# Patient Record
Sex: MALE | Race: WHITE | NOT HISPANIC OR LATINO | Employment: UNEMPLOYED | RURAL
[De-identification: names, ages, dates, MRNs, and addresses within clinical notes are randomized per-mention and may not be internally consistent; named-entity substitution may affect disease eponyms.]

---

## 2022-01-21 PROCEDURE — 99282 EMERGENCY DEPT VISIT SF MDM: CPT | Mod: ,,, | Performed by: EMERGENCY MEDICINE

## 2022-01-21 PROCEDURE — 99282 EMERGENCY DEPT VISIT SF MDM: CPT | Performed by: EMERGENCY MEDICINE

## 2022-01-21 PROCEDURE — 99282 PR EMERGENCY DEPT VISIT,LEVEL II: ICD-10-PCS | Mod: ,,, | Performed by: EMERGENCY MEDICINE

## 2022-01-21 PROCEDURE — 12001 RPR S/N/AX/GEN/TRNK 2.5CM/<: CPT | Performed by: EMERGENCY MEDICINE

## 2022-01-22 ENCOUNTER — HOSPITAL ENCOUNTER (EMERGENCY)
Facility: HOSPITAL | Age: 47
Discharge: HOME | End: 2022-01-22
Attending: EMERGENCY MEDICINE

## 2022-01-22 VITALS
SYSTOLIC BLOOD PRESSURE: 152 MMHG | HEART RATE: 88 BPM | HEIGHT: 69 IN | DIASTOLIC BLOOD PRESSURE: 94 MMHG | WEIGHT: 193 LBS | RESPIRATION RATE: 18 BRPM | TEMPERATURE: 98 F | BODY MASS INDEX: 28.58 KG/M2

## 2022-01-22 DIAGNOSIS — S61.218A LACERATION OF INDEX FINGER WITHOUT FOREIGN BODY WITHOUT DAMAGE TO NAIL, UNSPECIFIED LATERALITY, INITIAL ENCOUNTER: Primary | ICD-10-CM

## 2022-01-22 PROCEDURE — 12001 PR RESUPERF WND BODY <2.5CM: ICD-10-PCS | Mod: ,,, | Performed by: EMERGENCY MEDICINE

## 2022-01-22 PROCEDURE — 12001 RPR S/N/AX/GEN/TRNK 2.5CM/<: CPT | Mod: ,,, | Performed by: EMERGENCY MEDICINE

## 2022-01-22 NOTE — ED PROVIDER NOTES
Encounter Date: 1/21/2022    SCRIBE #1 NOTE: I, [place] Mahnaz, am scribing for, and in the presence of,  Peyman Baeza MD. I have scribed the entire note.       History     Chief Complaint   Patient presents with    Laceration     Laceration to right index finger. Had an open knife in pocket and cut his finger.     Patient is a 46 year old male who presents to the emergency department due to a laceration on his right index finger. Patient explains that around 20:00 last night he placed his hand into his pocket, cutting the index finger on an open knife that was in the pocket. Patient states that he bandaged up the area himself but became concerned about tendon damage so reported to the emergency department. Based on Dr. Baeza's exam it appears that this laceration is eligible to be glued.     The history is provided by the patient. No  was used.     Review of patient's allergies indicates:  No Known Allergies  No past medical history on file.  No past surgical history on file.  No family history on file.  Social History     Tobacco Use    Smoking status: Never Smoker    Smokeless tobacco: Never Used   Substance Use Topics    Alcohol use: Not Currently    Drug use: Not Currently     Review of Systems   Constitutional: Negative.    HENT: Negative.    Eyes: Negative.    Respiratory: Negative.    Cardiovascular: Negative.    Gastrointestinal: Negative.    Endocrine: Negative.    Genitourinary: Negative.    Musculoskeletal: Negative.    Skin:        Laceration on right index finger   Allergic/Immunologic: Negative.    Neurological: Negative.    Hematological: Negative.    Psychiatric/Behavioral: Negative.    All other systems reviewed and are negative.      Physical Exam     Initial Vitals [01/22/22 0001]   BP Pulse Resp Temp SpO2   (!) 152/94 88 18 97.8 °F (36.6 °C) --      MAP       --         Physical Exam    Musculoskeletal:      Right hand: Laceration (Index finger, 1.5cm) present.         Hands:            ED Course   Lac Repair    Date/Time: 1/22/2022 1:11 AM  Performed by: Peyman Baeza MD  Authorized by: Peyman Baeza MD     Consent:     Consent obtained:  Verbal    Consent given by:  Patient  Anesthesia:     Anesthesia method:  None  Laceration details:     Location:  Finger    Finger location:  R index finger    Length (cm):  1.5  Pre-procedure details:     Preparation:  Patient was prepped and draped in usual sterile fashion  Treatment:     Area cleansed with:  Saline    Irrigation solution:  Tap water  Skin repair:     Repair method: Dermabond.  Repair type:     Repair type:  Simple  Post-procedure details:     Dressing:  Open (no dressing)    Procedure completion:  Tolerated      Labs Reviewed - No data to display       Imaging Results    None          Medications - No data to display             Attending Attestation:           Physician Attestation for Scribe:  Physician Attestation Statement for Scribe #1: I, Peyman Baeza MD, reviewed documentation, as scribed by Carla Joya in my presence, and it is both accurate and complete.                      Clinical Impression:   Final diagnoses:  [S61.218A] Laceration of index finger without foreign body without damage to nail, unspecified laterality, initial encounter (Primary)          ED Disposition Condition    Discharge Stable        ED Prescriptions     None        Follow-up Information    None          Peyman Baeza MD  01/22/22 0444

## 2022-03-25 ENCOUNTER — ANESTHESIA EVENT (OUTPATIENT)
Dept: SURGERY | Facility: HOSPITAL | Age: 47
DRG: 494 | End: 2022-03-25

## 2022-03-25 ENCOUNTER — ANESTHESIA (OUTPATIENT)
Dept: SURGERY | Facility: HOSPITAL | Age: 47
DRG: 494 | End: 2022-03-25

## 2022-03-25 ENCOUNTER — HOSPITAL ENCOUNTER (INPATIENT)
Facility: HOSPITAL | Age: 47
LOS: 2 days | Discharge: HOME | DRG: 494 | End: 2022-03-27
Attending: ORTHOPAEDIC SURGERY | Admitting: ORTHOPAEDIC SURGERY

## 2022-03-25 DIAGNOSIS — L03.90 CELLULITIS, UNSPECIFIED CELLULITIS SITE: Primary | ICD-10-CM

## 2022-03-25 DIAGNOSIS — R60.9 EDEMA: ICD-10-CM

## 2022-03-25 DIAGNOSIS — M00.9 SEPTIC ARTHRITIS OF RIGHT ANKLE: ICD-10-CM

## 2022-03-25 DIAGNOSIS — M00.9 SEPTIC ARTHRITIS OF RIGHT ANKLE, DUE TO UNSPECIFIED ORGANISM: ICD-10-CM

## 2022-03-25 LAB
ANION GAP SERPL CALCULATED.3IONS-SCNC: 10 MMOL/L (ref 7–16)
BASOPHILS # BLD AUTO: 0.04 K/UL (ref 0–0.2)
BASOPHILS NFR BLD AUTO: 0.3 % (ref 0–1)
BUN SERPL-MCNC: 13 MG/DL (ref 7–18)
BUN/CREAT SERPL: 16 (ref 6–20)
CALCIUM SERPL-MCNC: 8.2 MG/DL (ref 8.5–10.1)
CHLORIDE SERPL-SCNC: 100 MMOL/L (ref 98–107)
CLARITY FLD: ABNORMAL
CO2 SERPL-SCNC: 29 MMOL/L (ref 21–32)
COLOR FLD: ABNORMAL
CREAT SERPL-MCNC: 0.79 MG/DL (ref 0.7–1.3)
CRYSTALS SNV MICRO: NORMAL
DIFFERENTIAL METHOD BLD: ABNORMAL
EOSINOPHIL # BLD AUTO: 0.13 K/UL (ref 0–0.5)
EOSINOPHIL NFR BLD AUTO: 0.9 % (ref 1–4)
ERYTHROCYTE [DISTWIDTH] IN BLOOD BY AUTOMATED COUNT: 12.7 % (ref 11.5–14.5)
FLUAV AG UPPER RESP QL IA.RAPID: NEGATIVE
FLUBV AG UPPER RESP QL IA.RAPID: NEGATIVE
GLUCOSE SERPL-MCNC: 182 MG/DL (ref 74–106)
GRAM STN SPEC: NORMAL
GRANULOCYTES NFR SNV MANUAL: 92 % (ref 0–25)
HCT VFR BLD AUTO: 42.9 % (ref 40–54)
HGB BLD-MCNC: 14.3 G/DL (ref 13.5–18)
IMM GRANULOCYTES # BLD AUTO: 0.11 K/UL (ref 0–0.04)
IMM GRANULOCYTES NFR BLD: 0.8 % (ref 0–0.4)
LACTATE SERPL-SCNC: 1.7 MMOL/L (ref 0.4–2)
LYMPHOCYTES # BLD AUTO: 2.09 K/UL (ref 1–4.8)
LYMPHOCYTES NFR BLD AUTO: 14.6 % (ref 27–41)
LYMPHOCYTES NFR SNV MANUAL: 6 %
MACROPHAGES NFR SNV MANUAL: 1 %
MCH RBC QN AUTO: 29.1 PG (ref 27–31)
MCHC RBC AUTO-ENTMCNC: 33.3 G/DL (ref 32–36)
MCV RBC AUTO: 87.4 FL (ref 80–96)
MONOCYTES # BLD AUTO: 0.93 K/UL (ref 0–0.8)
MONOCYTES NFR BLD AUTO: 6.5 % (ref 2–6)
MONOCYTES NFR SNV MANUAL: 1 %
MPC BLD CALC-MCNC: 8.9 FL (ref 9.4–12.4)
NEUTROPHILS # BLD AUTO: 10.99 K/UL (ref 1.8–7.7)
NEUTROPHILS NFR BLD AUTO: 76.9 % (ref 53–65)
NRBC # BLD AUTO: 0 X10E3/UL
NRBC, AUTO (.00): 0 %
PLATELET # BLD AUTO: 342 K/UL (ref 150–400)
POTASSIUM SERPL-SCNC: 3.6 MMOL/L (ref 3.5–5.1)
RBC # BLD AUTO: 4.91 M/UL (ref 4.6–6.2)
RBC # SNV MANUAL: ABNORMAL /CUMM
SARS-COV+SARS-COV-2 AG RESP QL IA.RAPID: NEGATIVE
SODIUM SERPL-SCNC: 135 MMOL/L (ref 136–145)
URATE SERPL-MCNC: 3.5 MG/DL (ref 3.5–7.2)
WBC # BLD AUTO: 14.29 K/UL (ref 4.5–11)
WBC # SNV MANUAL: ABNORMAL /CUMM

## 2022-03-25 PROCEDURE — 83605 ASSAY OF LACTIC ACID: CPT | Performed by: NURSE PRACTITIONER

## 2022-03-25 PROCEDURE — 87428 SARSCOV & INF VIR A&B AG IA: CPT | Performed by: ORTHOPAEDIC SURGERY

## 2022-03-25 PROCEDURE — 25000003 PHARM REV CODE 250: Performed by: EMERGENCY MEDICINE

## 2022-03-25 PROCEDURE — 85025 COMPLETE CBC W/AUTO DIFF WBC: CPT | Performed by: NURSE PRACTITIONER

## 2022-03-25 PROCEDURE — 87205 SMEAR GRAM STAIN: CPT | Performed by: EMERGENCY MEDICINE

## 2022-03-25 PROCEDURE — 89050 BODY FLUID CELL COUNT: CPT | Performed by: EMERGENCY MEDICINE

## 2022-03-25 PROCEDURE — 87653 STREP B DNA AMP PROBE: CPT | Performed by: EMERGENCY MEDICINE

## 2022-03-25 PROCEDURE — 87040 BLOOD CULTURE FOR BACTERIA: CPT | Performed by: NURSE PRACTITIONER

## 2022-03-25 PROCEDURE — D9220A PRA ANESTHESIA: Mod: ,,, | Performed by: ANESTHESIOLOGY

## 2022-03-25 PROCEDURE — 96372 THER/PROPH/DIAG INJ SC/IM: CPT

## 2022-03-25 PROCEDURE — 63600175 PHARM REV CODE 636 W HCPCS: Performed by: ANESTHESIOLOGY

## 2022-03-25 PROCEDURE — 99285 EMERGENCY DEPT VISIT HI MDM: CPT

## 2022-03-25 PROCEDURE — 71000033 HC RECOVERY, INTIAL HOUR: Performed by: ORTHOPAEDIC SURGERY

## 2022-03-25 PROCEDURE — 25000003 PHARM REV CODE 250: Performed by: NURSE PRACTITIONER

## 2022-03-25 PROCEDURE — 63600175 PHARM REV CODE 636 W HCPCS: Performed by: NURSE PRACTITIONER

## 2022-03-25 PROCEDURE — 99284 EMERGENCY DEPT VISIT MOD MDM: CPT | Mod: ,,, | Performed by: NURSE PRACTITIONER

## 2022-03-25 PROCEDURE — 37000008 HC ANESTHESIA 1ST 15 MINUTES: Performed by: ORTHOPAEDIC SURGERY

## 2022-03-25 PROCEDURE — 99284 PR EMERGENCY DEPT VISIT,LEVEL IV: ICD-10-PCS | Mod: ,,, | Performed by: NURSE PRACTITIONER

## 2022-03-25 PROCEDURE — 84550 ASSAY OF BLOOD/URIC ACID: CPT | Performed by: NURSE PRACTITIONER

## 2022-03-25 PROCEDURE — 37000009 HC ANESTHESIA EA ADD 15 MINS: Performed by: ORTHOPAEDIC SURGERY

## 2022-03-25 PROCEDURE — 27201423 OPTIME MED/SURG SUP & DEVICES STERILE SUPPLY: Performed by: ORTHOPAEDIC SURGERY

## 2022-03-25 PROCEDURE — 87070 CULTURE OTHR SPECIMN AEROBIC: CPT | Performed by: ORTHOPAEDIC SURGERY

## 2022-03-25 PROCEDURE — 11000001 HC ACUTE MED/SURG PRIVATE ROOM

## 2022-03-25 PROCEDURE — 36000706: Performed by: ORTHOPAEDIC SURGERY

## 2022-03-25 PROCEDURE — 80048 BASIC METABOLIC PNL TOTAL CA: CPT | Performed by: NURSE PRACTITIONER

## 2022-03-25 PROCEDURE — 36000707: Performed by: ORTHOPAEDIC SURGERY

## 2022-03-25 PROCEDURE — D9220A PRA ANESTHESIA: ICD-10-PCS | Mod: ,,, | Performed by: ANESTHESIOLOGY

## 2022-03-25 PROCEDURE — 36415 COLL VENOUS BLD VENIPUNCTURE: CPT | Performed by: NURSE PRACTITIONER

## 2022-03-25 PROCEDURE — 87075 CULTR BACTERIA EXCEPT BLOOD: CPT | Performed by: ORTHOPAEDIC SURGERY

## 2022-03-25 RX ORDER — CLINDAMYCIN HYDROCHLORIDE 300 MG/1
300 CAPSULE ORAL EVERY 6 HOURS
Qty: 40 CAPSULE | Refills: 0 | Status: SHIPPED | OUTPATIENT
Start: 2022-03-25 | End: 2022-04-04

## 2022-03-25 RX ORDER — HYDROCODONE BITARTRATE AND ACETAMINOPHEN 7.5; 325 MG/1; MG/1
1 TABLET ORAL EVERY 6 HOURS PRN
Status: DISCONTINUED | OUTPATIENT
Start: 2022-03-25 | End: 2022-03-27 | Stop reason: HOSPADM

## 2022-03-25 RX ORDER — LABETALOL HYDROCHLORIDE 5 MG/ML
10 INJECTION, SOLUTION INTRAVENOUS EVERY 5 MIN PRN
Status: COMPLETED | OUTPATIENT
Start: 2022-03-26 | End: 2022-03-26

## 2022-03-25 RX ORDER — LIDOCAINE HYDROCHLORIDE 10 MG/ML
10 INJECTION INFILTRATION; PERINEURAL
Status: COMPLETED | OUTPATIENT
Start: 2022-03-25 | End: 2022-03-25

## 2022-03-25 RX ORDER — HYDRALAZINE HYDROCHLORIDE 20 MG/ML
INJECTION INTRAMUSCULAR; INTRAVENOUS
Status: DISCONTINUED | OUTPATIENT
Start: 2022-03-25 | End: 2022-03-25

## 2022-03-25 RX ORDER — IBUPROFEN 800 MG/1
800 TABLET ORAL EVERY 8 HOURS PRN
Qty: 30 TABLET | Refills: 0 | Status: SHIPPED | OUTPATIENT
Start: 2022-03-25 | End: 2022-04-04

## 2022-03-25 RX ORDER — SODIUM CHLORIDE 0.9 % (FLUSH) 0.9 %
10 SYRINGE (ML) INJECTION EVERY 12 HOURS PRN
Status: DISCONTINUED | OUTPATIENT
Start: 2022-03-25 | End: 2022-03-27 | Stop reason: HOSPADM

## 2022-03-25 RX ORDER — MORPHINE SULFATE 2 MG/ML
2 INJECTION, SOLUTION INTRAMUSCULAR; INTRAVENOUS
Status: COMPLETED | OUTPATIENT
Start: 2022-03-25 | End: 2022-03-25

## 2022-03-25 RX ORDER — FENTANYL CITRATE 50 UG/ML
INJECTION, SOLUTION INTRAMUSCULAR; INTRAVENOUS
Status: DISCONTINUED | OUTPATIENT
Start: 2022-03-25 | End: 2022-03-25

## 2022-03-25 RX ORDER — HYDROCODONE BITARTRATE AND ACETAMINOPHEN 10; 325 MG/1; MG/1
1 TABLET ORAL EVERY 6 HOURS PRN
Status: DISCONTINUED | OUTPATIENT
Start: 2022-03-25 | End: 2022-03-27 | Stop reason: HOSPADM

## 2022-03-25 RX ORDER — CEFAZOLIN SODIUM 1 G/3ML
INJECTION, POWDER, FOR SOLUTION INTRAMUSCULAR; INTRAVENOUS
Status: DISCONTINUED | OUTPATIENT
Start: 2022-03-25 | End: 2022-03-25

## 2022-03-25 RX ORDER — HYDROCODONE BITARTRATE AND ACETAMINOPHEN 5; 325 MG/1; MG/1
1 TABLET ORAL EVERY 6 HOURS PRN
Status: DISCONTINUED | OUTPATIENT
Start: 2022-03-25 | End: 2022-03-27 | Stop reason: HOSPADM

## 2022-03-25 RX ORDER — LABETALOL HYDROCHLORIDE 5 MG/ML
10 INJECTION, SOLUTION INTRAVENOUS EVERY 5 MIN PRN
Status: DISCONTINUED | OUTPATIENT
Start: 2022-03-26 | End: 2022-03-25

## 2022-03-25 RX ORDER — CEFTRIAXONE 1 G/1
1 INJECTION, POWDER, FOR SOLUTION INTRAMUSCULAR; INTRAVENOUS
Status: COMPLETED | OUTPATIENT
Start: 2022-03-25 | End: 2022-03-25

## 2022-03-25 RX ORDER — ONDANSETRON 2 MG/ML
4 INJECTION INTRAMUSCULAR; INTRAVENOUS EVERY 8 HOURS PRN
Status: DISCONTINUED | OUTPATIENT
Start: 2022-03-25 | End: 2022-03-27 | Stop reason: HOSPADM

## 2022-03-25 RX ORDER — PROPOFOL 10 MG/ML
VIAL (ML) INTRAVENOUS
Status: DISCONTINUED | OUTPATIENT
Start: 2022-03-25 | End: 2022-03-25

## 2022-03-25 RX ORDER — LIDOCAINE HYDROCHLORIDE 10 MG/ML
5 INJECTION, SOLUTION EPIDURAL; INFILTRATION; INTRACAUDAL; PERINEURAL
Status: COMPLETED | OUTPATIENT
Start: 2022-03-25 | End: 2022-03-25

## 2022-03-25 RX ORDER — CLINDAMYCIN PHOSPHATE 600 MG/50ML
600 INJECTION, SOLUTION INTRAVENOUS
Status: DISCONTINUED | OUTPATIENT
Start: 2022-03-25 | End: 2022-03-27 | Stop reason: HOSPADM

## 2022-03-25 RX ADMIN — HYDROCODONE BITARTRATE AND ACETAMINOPHEN 1 TABLET: 7.5; 325 TABLET ORAL at 05:03

## 2022-03-25 RX ADMIN — MORPHINE SULFATE 2 MG: 2 INJECTION, SOLUTION INTRAMUSCULAR; INTRAVENOUS at 06:03

## 2022-03-25 RX ADMIN — LIDOCAINE HYDROCHLORIDE 10 ML: 10 INJECTION, SOLUTION INFILTRATION; PERINEURAL at 08:03

## 2022-03-25 RX ADMIN — FENTANYL CITRATE 100 MCG: 50 INJECTION INTRAMUSCULAR; INTRAVENOUS at 11:03

## 2022-03-25 RX ADMIN — HYDRALAZINE HYDROCHLORIDE 20 MG: 20 INJECTION INTRAMUSCULAR; INTRAVENOUS at 11:03

## 2022-03-25 RX ADMIN — PROPOFOL 150 MG: 10 INJECTION, EMULSION INTRAVENOUS at 11:03

## 2022-03-25 RX ADMIN — CEFTRIAXONE SODIUM 1 G: 1 INJECTION, POWDER, FOR SOLUTION INTRAMUSCULAR; INTRAVENOUS at 05:03

## 2022-03-25 RX ADMIN — CEFAZOLIN 2000 G: 1 INJECTION, POWDER, FOR SOLUTION INTRAMUSCULAR; INTRAVENOUS; PARENTERAL at 11:03

## 2022-03-25 RX ADMIN — LIDOCAINE HYDROCHLORIDE 50 MG: 10 INJECTION, SOLUTION EPIDURAL; INFILTRATION; INTRACAUDAL; PERINEURAL at 10:03

## 2022-03-25 NOTE — PROVIDER PROGRESS NOTES - EMERGENCY DEPT.
Encounter Date: 3/25/2022    ED Physician Progress Notes        Pt states he got no relief from the norco and he is requesting an injection for pain. Pt states his daughter is here to drive him home.

## 2022-03-25 NOTE — ED PROVIDER NOTES
Encounter Date: 3/25/2022       History     Chief Complaint   Patient presents with    Leg Swelling     Pt presents with pain and swelling to right ankle x 2 days. Pt denies injury. 2+ edema noted to right ankle with erythema. Decreased ROM to right ankle.  Pt states pain is 10/10. No PCP and he states he doesn't take any medications.         Review of patient's allergies indicates:  No Known Allergies  History reviewed. No pertinent past medical history.  History reviewed. No pertinent surgical history.  History reviewed. No pertinent family history.  Social History     Tobacco Use    Smoking status: Current Every Day Smoker     Types: Cigarettes    Smokeless tobacco: Never Used   Substance Use Topics    Alcohol use: Not Currently    Drug use: Not Currently     Review of Systems   Constitutional: Negative for fever.   HENT: Negative for sore throat.    Respiratory: Negative for shortness of breath.    Cardiovascular: Negative for chest pain.   Gastrointestinal: Negative for nausea.   Genitourinary: Negative for dysuria.   Musculoskeletal: Negative for back pain.        Right ankle pain   Skin: Negative for rash.   Neurological: Negative for dizziness, weakness and headaches.   Hematological: Does not bruise/bleed easily.   Psychiatric/Behavioral: Negative for behavioral problems.       Physical Exam     Initial Vitals [03/25/22 1700]   BP Pulse Resp Temp SpO2   (!) 151/116 103 18 97.6 °F (36.4 °C) 97 %      MAP       --         Physical Exam    Nursing note and vitals reviewed.  Constitutional: He appears well-developed.   Cardiovascular: Normal rate and regular rhythm.   Pulmonary/Chest: Breath sounds normal.   Musculoskeletal:      Right ankle: Swelling present. Tenderness present. Decreased range of motion.     Neurological: He is alert and oriented to person, place, and time.   Psychiatric: He has a normal mood and affect. Thought content normal.         Medical Screening Exam   See Full Note    ED Course    Procedures  Labs Reviewed   BASIC METABOLIC PANEL - Abnormal; Notable for the following components:       Result Value    Sodium 135 (*)     Glucose 182 (*)     Calcium 8.2 (*)     All other components within normal limits   CBC WITH DIFFERENTIAL - Abnormal; Notable for the following components:    WBC 14.29 (*)     MPV 8.9 (*)     Neutrophils % 76.9 (*)     Lymphocytes % 14.6 (*)     Monocytes % 6.5 (*)     Eosinophils % 0.9 (*)     Immature Granulocytes % 0.8 (*)     Neutrophils, Abs 10.99 (*)     Monocytes, Absolute 0.93 (*)     Immature Granulocytes, Absolute 0.11 (*)     All other components within normal limits   LACTIC ACID, PLASMA - Normal   URIC ACID - Normal   CULTURE, BLOOD   CULTURE, BLOOD   CBC W/ AUTO DIFFERENTIAL    Narrative:     The following orders were created for panel order CBC auto differential.  Procedure                               Abnormality         Status                     ---------                               -----------         ------                     CBC with Differential[[id number]]        Abnormal            Final result                 Please view results for these tests on the individual orders.          Imaging Results          X-Ray Ankle Complete Right (Final result)  Result time 03/25/22 17:37:09    Final result by Orlin Peña DO (03/25/22 17:37:09)                 Impression:      As above.    Point of Service: [facility]      Electronically signed by: Orlin Peña  Date:    03/25/2022  Time:    17:37             Narrative:    EXAMINATION:  XR ANKLE COMPLETE 3 VIEW RIGHT    CLINICAL HISTORY:  Edema, unspecified    COMPARISON:  None    TECHNIQUE:  Frontal, lateral, and oblique views of the right ankle.    FINDINGS:  Plantar calcaneal spurring.  No convincing acute fracture or dislocation demonstrated. No concerning radiopaque foreign body visualized.  Soft tissue swelling.                                 Medications   HYDROcodone-acetaminophen 7.5-325  mg per tablet 1 tablet (1 tablet Oral Given 3/25/22 1754)   LIDOcaine HCL 10 mg/ml (1%) injection 10 mL (has no administration in time range)   cefTRIAXone injection 1 g (1 g Intramuscular Given 3/25/22 1722)   morphine injection 2 mg (2 mg Intramuscular Given 3/25/22 1846)                       Clinical Impression:   Final diagnoses:  [R60.9] Edema  [L03.90] Cellulitis, unspecified cellulitis site (Primary)          ED Disposition Condition    Discharge Stable        ED Prescriptions     Medication Sig Dispense Start Date End Date Auth. Provider    clindamycin (CLEOCIN) 300 MG capsule Take 1 capsule (300 mg total) by mouth every 6 (six) hours. for 10 days 40 capsule 3/25/2022 4/4/2022 MADHURI Carrillo    ibuprofen (ADVIL,MOTRIN) 800 MG tablet Take 1 tablet (800 mg total) by mouth every 8 (eight) hours as needed for Pain. 30 tablet 3/25/2022 4/4/2022 MADHURI Carrillo        Follow-up Information    None          MADHURI Carrillo  03/25/22 1828       MADHURI Carrillo  03/25/22 6335

## 2022-03-26 PROCEDURE — 25000003 PHARM REV CODE 250: Performed by: NURSE PRACTITIONER

## 2022-03-26 PROCEDURE — 63600175 PHARM REV CODE 636 W HCPCS: Performed by: ANESTHESIOLOGY

## 2022-03-26 PROCEDURE — 25000003 PHARM REV CODE 250: Performed by: ANESTHESIOLOGY

## 2022-03-26 PROCEDURE — 11000001 HC ACUTE MED/SURG PRIVATE ROOM

## 2022-03-26 PROCEDURE — 94761 N-INVAS EAR/PLS OXIMETRY MLT: CPT

## 2022-03-26 PROCEDURE — 25000003 PHARM REV CODE 250: Performed by: HOSPITALIST

## 2022-03-26 PROCEDURE — 25000003 PHARM REV CODE 250: Performed by: ORTHOPAEDIC SURGERY

## 2022-03-26 PROCEDURE — 63600175 PHARM REV CODE 636 W HCPCS: Performed by: ORTHOPAEDIC SURGERY

## 2022-03-26 RX ORDER — ESOMEPRAZOLE MAGNESIUM 40 MG/1
40 CAPSULE, DELAYED RELEASE ORAL
COMMUNITY

## 2022-03-26 RX ORDER — HYDROMORPHONE HYDROCHLORIDE 2 MG/ML
0.5 INJECTION, SOLUTION INTRAMUSCULAR; INTRAVENOUS; SUBCUTANEOUS EVERY 5 MIN PRN
Status: DISCONTINUED | OUTPATIENT
Start: 2022-03-26 | End: 2022-03-26 | Stop reason: HOSPADM

## 2022-03-26 RX ORDER — CLONIDINE HYDROCHLORIDE 0.1 MG/1
0.1 TABLET ORAL ONCE
Status: COMPLETED | OUTPATIENT
Start: 2022-03-26 | End: 2022-03-26

## 2022-03-26 RX ORDER — MAG HYDROX/ALUMINUM HYD/SIMETH 200-200-20
30 SUSPENSION, ORAL (FINAL DOSE FORM) ORAL EVERY 6 HOURS PRN
Status: DISCONTINUED | OUTPATIENT
Start: 2022-03-26 | End: 2022-03-27 | Stop reason: HOSPADM

## 2022-03-26 RX ORDER — OXYCODONE HYDROCHLORIDE 5 MG/1
5 TABLET ORAL
Status: DISCONTINUED | OUTPATIENT
Start: 2022-03-26 | End: 2022-03-26 | Stop reason: HOSPADM

## 2022-03-26 RX ORDER — ALUMINUM HYDROXIDE, MAGNESIUM HYDROXIDE, AND SIMETHICONE 2400; 240; 2400 MG/30ML; MG/30ML; MG/30ML
30 SUSPENSION ORAL EVERY 6 HOURS PRN
Status: DISCONTINUED | OUTPATIENT
Start: 2022-03-26 | End: 2022-03-26

## 2022-03-26 RX ORDER — MEPERIDINE HYDROCHLORIDE 25 MG/ML
25 INJECTION INTRAMUSCULAR; INTRAVENOUS; SUBCUTANEOUS EVERY 10 MIN PRN
Status: DISCONTINUED | OUTPATIENT
Start: 2022-03-26 | End: 2022-03-26 | Stop reason: HOSPADM

## 2022-03-26 RX ORDER — MORPHINE SULFATE 10 MG/ML
4 INJECTION INTRAMUSCULAR; INTRAVENOUS; SUBCUTANEOUS EVERY 5 MIN PRN
Status: DISCONTINUED | OUTPATIENT
Start: 2022-03-26 | End: 2022-03-26 | Stop reason: HOSPADM

## 2022-03-26 RX ORDER — ONDANSETRON 2 MG/ML
4 INJECTION INTRAMUSCULAR; INTRAVENOUS DAILY PRN
Status: DISCONTINUED | OUTPATIENT
Start: 2022-03-26 | End: 2022-03-26 | Stop reason: HOSPADM

## 2022-03-26 RX ORDER — SODIUM CHLORIDE, SODIUM LACTATE, POTASSIUM CHLORIDE, CALCIUM CHLORIDE 600; 310; 30; 20 MG/100ML; MG/100ML; MG/100ML; MG/100ML
125 INJECTION, SOLUTION INTRAVENOUS CONTINUOUS
Status: DISCONTINUED | OUTPATIENT
Start: 2022-03-27 | End: 2022-03-27 | Stop reason: HOSPADM

## 2022-03-26 RX ORDER — DIPHENHYDRAMINE HYDROCHLORIDE 50 MG/ML
25 INJECTION INTRAMUSCULAR; INTRAVENOUS EVERY 6 HOURS PRN
Status: DISCONTINUED | OUTPATIENT
Start: 2022-03-26 | End: 2022-03-26 | Stop reason: HOSPADM

## 2022-03-26 RX ADMIN — HYDROCODONE BITARTRATE AND ACETAMINOPHEN 1 TABLET: 10; 325 TABLET ORAL at 08:03

## 2022-03-26 RX ADMIN — CLINDAMYCIN IN 5 PERCENT DEXTROSE 600 MG: 12 INJECTION, SOLUTION INTRAVENOUS at 10:03

## 2022-03-26 RX ADMIN — CLINDAMYCIN IN 5 PERCENT DEXTROSE 600 MG: 12 INJECTION, SOLUTION INTRAVENOUS at 02:03

## 2022-03-26 RX ADMIN — CLINDAMYCIN IN 5 PERCENT DEXTROSE 600 MG: 12 INJECTION, SOLUTION INTRAVENOUS at 06:03

## 2022-03-26 RX ADMIN — SODIUM CHLORIDE, POTASSIUM CHLORIDE, SODIUM LACTATE AND CALCIUM CHLORIDE 125 ML/HR: 600; 310; 30; 20 INJECTION, SOLUTION INTRAVENOUS at 11:03

## 2022-03-26 RX ADMIN — VANCOMYCIN HYDROCHLORIDE 1750 MG: 5 INJECTION, POWDER, LYOPHILIZED, FOR SOLUTION INTRAVENOUS at 11:03

## 2022-03-26 RX ADMIN — ALUMINUM HYDROXIDE, MAGNESIUM HYDROXIDE, AND SIMETHICONE 30 ML: 200; 200; 20 SUSPENSION ORAL at 05:03

## 2022-03-26 RX ADMIN — HYDROCODONE BITARTRATE AND ACETAMINOPHEN 1 TABLET: 7.5; 325 TABLET ORAL at 11:03

## 2022-03-26 RX ADMIN — LABETALOL HYDROCHLORIDE 10 MG: 5 INJECTION INTRAVENOUS at 01:03

## 2022-03-26 RX ADMIN — CLINDAMYCIN IN 5 PERCENT DEXTROSE 600 MG: 12 INJECTION, SOLUTION INTRAVENOUS at 12:03

## 2022-03-26 RX ADMIN — HYDROCODONE BITARTRATE AND ACETAMINOPHEN 1 TABLET: 10; 325 TABLET ORAL at 09:03

## 2022-03-26 RX ADMIN — HYDROCODONE BITARTRATE AND ACETAMINOPHEN 1 TABLET: 10; 325 TABLET ORAL at 01:03

## 2022-03-26 RX ADMIN — HYDROMORPHONE HYDROCHLORIDE 0.5 MG: 2 INJECTION INTRAMUSCULAR; INTRAVENOUS; SUBCUTANEOUS at 12:03

## 2022-03-26 RX ADMIN — VANCOMYCIN HYDROCHLORIDE 2250 MG: 1 INJECTION, POWDER, LYOPHILIZED, FOR SOLUTION INTRAVENOUS at 01:03

## 2022-03-26 RX ADMIN — CLONIDINE HYDROCHLORIDE 0.1 MG: 0.1 TABLET ORAL at 05:03

## 2022-03-26 RX ADMIN — LABETALOL HYDROCHLORIDE 10 MG: 5 INJECTION INTRAVENOUS at 02:03

## 2022-03-26 NOTE — HPI
46-year-old male with a 4 day history of right ankle swelling and pain with fever and chills starting 3 days ago who had his joint aspirate emergency room with cloudy material obtained with septic arthritis of the right ankle joint needing irrigation debridement of the ankle.  Right lower extremity moves his toes he has sensation to touch has palpable pulses he has swelling about the ankle pain on attempted motion of the ankle.  Does move his toes.  Aspiration revealed cloudy fluid from the ankle joint.  Patient aspirated in the emergency room  X-rays show no fracture subluxation right ankle.  Impression septic right ankle joint  Plan irrigation debridement right ankle with admission for IV antibiotics

## 2022-03-26 NOTE — CONSULTS
[facility] - Emergency Department  Orthopedics  Consult Note    Patient Name: Caleb Charlton  MRN: [id number]  Admission Date: 3/25/2022  Hospital Length of Stay: 0 days  Attending Provider: Jalen Cohen MD  Primary Care Provider: Primary Doctor No    Patient information was obtained from patient and ER records.     Consults  Subjective:     Principal Problem:<principal problem not specified>    Chief Complaint:   Chief Complaint   Patient presents with    Leg Swelling        HPI: 46-year-old male with a 4 day history of right ankle swelling and pain with fever and chills starting 3 days ago who had his joint aspirate emergency room with cloudy material obtained with septic arthritis of the right ankle joint needing irrigation debridement of the ankle.  Right lower extremity moves his toes he has sensation to touch has palpable pulses he has swelling about the ankle pain on attempted motion of the ankle.  Does move his toes.  Aspiration revealed cloudy fluid from the ankle joint.  Patient aspirated in the emergency room  X-rays show no fracture subluxation right ankle.  Impression septic right ankle joint  Plan irrigation debridement right ankle with admission for IV antibiotics        No new subjective & objective note has been filed under this hospital service since the last note was generated.    Assessment/Plan:     No notes have been filed under this hospital service.  Service: Orthopedic Surgery      Thank you for your consult. Irrigation debridement right ankle in operating room    Jalen Cohen MD  Orthopedics  [facility] - Emergency Department

## 2022-03-26 NOTE — H&P
[facility] - Emergency Department  Orthopedics  H&P    Patient Name: Caleb Charlton  MRN: [id number]  Admission Date: 3/25/2022  Primary Care Provider: Primary Doctor No    Patient information was obtained from patient and ER records.     Subjective:     Principal Problem:<principal problem not specified>    Chief Complaint:   Chief Complaint   Patient presents with    Leg Swelling        HPI: 46-year-old male with a 4 day history of right ankle swelling and pain with fever and chills starting 3 days ago who had his joint aspirate emergency room with cloudy material obtained with septic arthritis of the right ankle joint needing irrigation debridement of the ankle.  Right lower extremity moves his toes he has sensation to touch has palpable pulses he has swelling about the ankle pain on attempted motion of the ankle.  Does move his toes.  Aspiration revealed cloudy fluid from the ankle joint.  X-rays show no fracture subluxation right ankle.  Impression septic right ankle joint  Plan irrigation debridement right ankle with admission for IV antibiotics        History reviewed. No pertinent past medical history.    History reviewed. No pertinent surgical history.    Review of patient's allergies indicates:  No Known Allergies    Current Facility-Administered Medications   Medication    HYDROcodone-acetaminophen 7.5-325 mg per tablet 1 tablet     Current Outpatient Medications   Medication Sig    clindamycin (CLEOCIN) 300 MG capsule Take 1 capsule (300 mg total) by mouth every 6 (six) hours. for 10 days    ibuprofen (ADVIL,MOTRIN) 800 MG tablet Take 1 tablet (800 mg total) by mouth every 8 (eight) hours as needed for Pain.     Family History    None       Tobacco Use    Smoking status: Current Every Day Smoker     Types: Cigarettes    Smokeless tobacco: Never Used   Substance and Sexual Activity    Alcohol use: Not Currently    Drug use: Not Currently    Sexual activity: Yes     Review of Systems  "  Constitutional: Negative for decreased appetite.   HENT:  Negative for congestion and ear discharge.    Eyes:  Negative for blurred vision.   Cardiovascular:  Negative for chest pain and syncope.   Respiratory:  Negative for cough and wheezing.    Endocrine: Negative for cold intolerance and polyuria.   Hematologic/Lymphatic: Negative for adenopathy and bleeding problem.   Skin:  Negative for color change, nail changes and suspicious lesions.   Musculoskeletal:  Positive for joint pain and joint swelling. Negative for muscle cramps and myalgias.   Gastrointestinal:  Negative for bloating and abdominal pain.   Genitourinary:  Negative for frequency and hematuria.   Neurological:  Negative for brief paralysis, sensory change and weakness.   Psychiatric/Behavioral:  Negative for altered mental status.    Allergic/Immunologic: Negative for hives.   Objective:     Vital Signs (Most Recent):  Temp: 97.6 °F (36.4 °C) (03/25/22 1700)  Pulse: 103 (03/25/22 1700)  Resp: 18 (03/25/22 1846)  BP: (!) 141/89 (03/25/22 2208)  SpO2: 97 % (03/25/22 1700)   Vital Signs (24h Range):  Temp:  [97.6 °F (36.4 °C)] 97.6 °F (36.4 °C)  Pulse:  [103] 103  Resp:  [16-18] 18  SpO2:  [97 %] 97 %  BP: (141-151)/() 141/89     Weight: 90.7 kg (200 lb)  Height: 5' 9" (175.3 cm)  Body mass index is 29.53 kg/m².    No intake or output data in the 24 hours ending 03/25/22 2215            Right Ankle/Foot Exam     Inspection   Effusion: Ankle - present     Swelling   The patient is swollen on the lateral malleolus and medial malleolus.    Other   Sensation: normal        Vascular Exam     Right Pulses  Dorsalis Pedis:      2+          Significant Labs: All pertinent labs within the past 24 hours have been reviewed.    Significant Imaging: X-Ray: I have reviewed all pertinent results/findings and my personal findings are:  No bony abnormalities right ankle    Assessment/Plan:     No notes have been filed under this hospital service.  Service: " Orthopedic Surgery      Jalen Cohen MD  Orthopedics  [facility] - Emergency Department

## 2022-03-26 NOTE — ANESTHESIA POSTPROCEDURE EVALUATION
Anesthesia Post Evaluation    Patient: Caleb Charlton    Procedure(s) Performed: Procedure(s) (LRB):  IRRIGATION AND DEBRIDEMENT, LOWER EXTREMITY (Right)    Final Anesthesia Type: general      Patient location during evaluation: PACU  Patient participation: Yes- Able to Participate  Level of consciousness: awake and sedated  Post-procedure vital signs: reviewed and stable  Pain management: adequate  Airway patency: patent    PONV status at discharge: No PONV  Anesthetic complications: no      Cardiovascular status: blood pressure returned to baseline  Respiratory status: unassisted  Hydration status: euvolemic  Follow-up not needed.          Vitals Value Taken Time   /89 03/25/22 2208   Temp 36.7 °C (98.1 °F) 03/25/22 2208   Pulse 88 03/25/22 2208   Resp 16 03/25/22 2208   SpO2 98 % 03/25/22 2208         No case tracking events are documented in the log.      Pain/Veronica Score: Pain Rating Prior to Med Admin: 5 (3/25/2022  6:46 PM)

## 2022-03-26 NOTE — PROGRESS NOTES
Pharmacy consulted to dose and follow vancomycin for septic arthritis in this 47 y/o male patient.    Based on pt weight and renal function, will give a one time loading dose of 2250mg, then begin a regimen of 1750mg every 12 hours. Will check a trough before the 4th dose.    Pharmacy will continue to monitor and make adjustments as needed.    Thank you for the consult,  Carmen Metz, PharmD  [phone]

## 2022-03-26 NOTE — OP NOTE
[facility] - Orthopedic Periop Services  General Surgery  Operative Note    SUMMARY     Date of Procedure: 3/25/2022     Procedure: Procedure(s) (LRB):  IRRIGATION AND DEBRIDEMENT, LOWER EXTREMITY (Right)       Surgeon(s) and Role:     * Jaeln Cohen MD - Primary    Assisting Surgeon: None    Pre-Operative Diagnosis: Septic arthritis of right ankle [M00.9]    Post-Operative Diagnosis: Post-Op Diagnosis Codes:     * Septic arthritis of right ankle [M00.9]    Anesthesia: Choice    Operative Findings (including complications, if any):  After having risks and benefits of procedure explained at length the patient stating she understands risks benefits wished to proceed with the procedure a written informed consent was obtained patient was taken operating room placed in supine position operative table.  This time his right lower extremity was prepped and draped sterile fashion with a tourniquet over cast padding on proximal right thigh.  Leg was elevated tourniquet inflated 250 mmHg was up for total of 21 minutes.  At this time the anterior approach to the ankle was performed making an 8 cm incision in the midline between the mediolateral malleoli on the anterior aspect of the ankle was made with 15. Blade through the skin careful dissection down to the anterior retinaculum for the tendons was performed.  Neurovascular structures protected and retracted.  This time the retinaculum was opened in the interval between the extensor hallucis longus and extensor digitorum was developed protecting neurovascular structures anterior capsule the ankle was noted to be bulging.  This time the ankle joint was opened sharply and cultures obtained.  The material was obtained at this time capsule was opened in the ankle joint was put through range of motion irrigated out copious amounts normal saline with pulsatile lavage.  Once this been performed a single medium Hemovac drain was placed in the ankle joint.  Anterior capsule was then  closed loosely with 2-0 Monocryl.  Retinacular repair 2 Monocryl.  Subcuticular 2-0 Monocryl followed by skin staples a tourniquet was let down prior to wound closure hemostasis maintained Bovie electrocautery all counts correct there were no complications sterile occlusive dressing placed patient was awakened taken recovery room in good condition.    Description of Technical Procedures:     Significant Surgical Tasks Conducted by the Assistant(s), if Applicable:     Estimated Blood Loss (EBL): * No values recorded between 3/25/2022 11:15 AM and 3/25/2022 11:43 PM *           Implants: * No implants in log *    Specimens:   Specimen (24h ago, onward)            None                  Condition: Good    Disposition: PACU - hemodynamically stable.    Attestation: I was present and scrubbed for the entire procedure.

## 2022-03-26 NOTE — INTERVAL H&P NOTE
The patient has been examined and the H&P has been reviewed:    I concur with the findings and no changes have occurred since H&P was written.        Active Hospital Problems    Diagnosis  POA    Septic arthritis of right ankle [M00.9]  Yes      Resolved Hospital Problems   No resolved problems to display.

## 2022-03-26 NOTE — SUBJECTIVE & OBJECTIVE
History reviewed. No pertinent past medical history.    History reviewed. No pertinent surgical history.    Review of patient's allergies indicates:  No Known Allergies    Current Facility-Administered Medications   Medication    HYDROcodone-acetaminophen 7.5-325 mg per tablet 1 tablet     Current Outpatient Medications   Medication Sig    clindamycin (CLEOCIN) 300 MG capsule Take 1 capsule (300 mg total) by mouth every 6 (six) hours. for 10 days    ibuprofen (ADVIL,MOTRIN) 800 MG tablet Take 1 tablet (800 mg total) by mouth every 8 (eight) hours as needed for Pain.     Family History    None       Tobacco Use    Smoking status: Current Every Day Smoker     Types: Cigarettes    Smokeless tobacco: Never Used   Substance and Sexual Activity    Alcohol use: Not Currently    Drug use: Not Currently    Sexual activity: Yes     Review of Systems   Constitutional: Negative for decreased appetite.   HENT:  Negative for congestion and ear discharge.    Eyes:  Negative for blurred vision.   Cardiovascular:  Negative for chest pain and syncope.   Respiratory:  Negative for cough and wheezing.    Endocrine: Negative for cold intolerance and polyuria.   Hematologic/Lymphatic: Negative for adenopathy and bleeding problem.   Skin:  Negative for color change, nail changes and suspicious lesions.   Musculoskeletal:  Positive for joint pain and joint swelling. Negative for muscle cramps and myalgias.   Gastrointestinal:  Negative for bloating and abdominal pain.   Genitourinary:  Negative for frequency and hematuria.   Neurological:  Negative for brief paralysis, sensory change and weakness.   Psychiatric/Behavioral:  Negative for altered mental status.    Allergic/Immunologic: Negative for hives.   Objective:     Vital Signs (Most Recent):  Temp: 97.6 °F (36.4 °C) (03/25/22 1700)  Pulse: 103 (03/25/22 1700)  Resp: 18 (03/25/22 1846)  BP: (!) 141/89 (03/25/22 2208)  SpO2: 97 % (03/25/22 1700)   Vital Signs (24h Range):  Temp:  [97.6  "°F (36.4 °C)] 97.6 °F (36.4 °C)  Pulse:  [103] 103  Resp:  [16-18] 18  SpO2:  [97 %] 97 %  BP: (141-151)/() 141/89     Weight: 90.7 kg (200 lb)  Height: 5' 9" (175.3 cm)  Body mass index is 29.53 kg/m².    No intake or output data in the 24 hours ending 03/25/22 0174            Right Ankle/Foot Exam     Inspection   Effusion: Ankle - present     Swelling   The patient is swollen on the lateral malleolus and medial malleolus.    Other   Sensation: normal        Vascular Exam     Right Pulses  Dorsalis Pedis:      2+          Significant Labs: All pertinent labs within the past 24 hours have been reviewed.    Significant Imaging: X-Ray: I have reviewed all pertinent results/findings and my personal findings are:  No bony abnormalities right ankle  "

## 2022-03-26 NOTE — ANESTHESIA PREPROCEDURE EVALUATION
"                                                                                                             03/25/2022  Caleb Charlton is a 46 y.o., male.      Pre-op Assessment    I have reviewed the Patient Summary Reports.     I have reviewed the Nursing Notes. I have reviewed the NPO Status.   I have reviewed the Medications.     Review of Systems  Anesthesia Hx:  No problems with previous Anesthesia    Social:  No Alcohol Use, Smoker    Hematology/Oncology:  Hematology Normal   Oncology Normal     EENT/Dental:EENT/Dental Normal   Cardiovascular:  Cardiovascular Normal     Pulmonary:   COPD    Renal/:  Renal/ Normal     Hepatic/GI:   GERD Hepatitis, C HX "LIVER CHANGES"   Musculoskeletal:  Musculoskeletal Normal    Neurological:  Neurology Normal    Endocrine:  Endocrine Normal    Dermatological:  Skin Normal    Psych:  Psychiatric Normal  Hx IVDA         Physical Exam  General: Well nourished    Airway:  Mallampati: III / III  Mouth Opening: Normal  TM Distance: > 6 cm  Tongue: Normal  Neck ROM: Normal ROM    Chest/Lungs:  Clear to auscultation, Normal Respiratory Rate    Heart:  Rate: Normal  Rhythm: Regular Rhythm        Anesthesia Plan  Type of Anesthesia, risks & benefits discussed:    Anesthesia Type: Gen Supraglottic Airway  Intra-op Monitoring Plan: Standard ASA Monitors  Post Op Pain Control Plan: multimodal analgesia  Induction:  IV  Informed Consent: Informed consent signed with the Patient and all parties understand the risks and agree with anesthesia plan.  All questions answered.   ASA Score: 3 Emergent  Day of Surgery Review of History & Physical: H&P Update referred to the surgeon/provider.I have interviewed and examined the patient. I have reviewed the patient's H&P dated: There are no significant changes. H&P completed by Anesthesiologist.    Ready For Surgery From Anesthesia Perspective.     .      "

## 2022-03-26 NOTE — CARE UPDATE
Patient awake alert without any complaints.  Drain in place right ankle.  Cultures pending.  Continue antibiotics.  Plan DC tomorrow once cultures are obtained.  Will DC drain tomorrow.

## 2022-03-26 NOTE — ADDENDUM NOTE
Addendum  created 03/26/22 0000 by Rojas Mustafa MD    Order list changed, Pharmacy for encounter modified

## 2022-03-26 NOTE — OR NURSING
2355 REC'D TO PACU IN STABLE COND. AWAKE & ALERT. VS STABLE    0025 Transferred via stretcher to room 668 in stable cond. Bedside report given to jaleel pennington rn. Vs stable

## 2022-03-26 NOTE — NURSING
Treated blood pressure  with labaetlol twice, dr kinsey was on the floor and wrote a one time order for clonidine. I also treated with pain medicine as well

## 2022-03-26 NOTE — ED PROVIDER NOTES
"Encounter Date: 3/25/2022       History     Chief Complaint   Patient presents with    Leg Swelling     HPI  Review of patient's allergies indicates:  No Known Allergies  Past Medical History:   Diagnosis Date    HTN (hypertension) 3/27/2022    Septic arthritis of right ankle 3/25/2022     Past Surgical History:   Procedure Laterality Date    IRRIGATION AND DEBRIDEMENT OF LOWER EXTREMITY Right 3/25/2022    Procedure: IRRIGATION AND DEBRIDEMENT, LOWER EXTREMITY;  Surgeon: Jalen Cohen MD;  Location: [facility];  Service: Orthopedics;  Laterality: Right;     History reviewed. No pertinent family history.  Social History     Tobacco Use    Smoking status: Current Every Day Smoker     Types: Cigarettes    Smokeless tobacco: Never Used   Substance Use Topics    Alcohol use: Never    Drug use: Not Currently     Review of Systems    Physical Exam     Initial Vitals [03/25/22 1700]   BP Pulse Resp Temp SpO2   (!) 151/116 103 18 97.6 °F (36.4 °C) 97 %      MAP       --         Physical Exam    ED Course   Arthrocentesis    Date/Time: 5/10/2022 10:09 AM  Location procedure was performed: [facility] EMERGENCY DEPARTMENT  Performed by: Asher Zhang MD  Authorized by: Jalen Cohen MD   Pre-op diagnosis: septic joint  Post-op diagnosis: septic joint  Consent Done: Yes  Consent: Written consent obtained.  Risks and benefits: risks, benefits and alternatives were discussed  Consent given by: patient  Patient understanding: patient states understanding of the procedure being performed  Patient consent: the patient's understanding of the procedure matches consent given  Procedure consent: procedure consent matches procedure scheduled  Required items: required blood products, implants, devices, and special equipment available  Time out: Immediately prior to procedure a "time out" was called to verify the correct patient, procedure, equipment, support staff and site/side marked as required.  Indications: joint " swelling, pain and possible septic joint   Body area: ankle  Local anesthesia used: yes  Anesthesia: local infiltration    Anesthesia:  Local anesthesia used: yes  Local Anesthetic: lidocaine 1% without epinephrine  Anesthetic total: 3 mL    Patient sedated: no  Description of findings: purulent-appearing synovial fluid   Preparation: Patient was prepped and draped in the usual sterile fashion.  Needle size: 18 G  Approach: medial  Aspirate amount: 20 mL  Aspirate: cloudy and purulent  Patient tolerance: Patient tolerated the procedure well with no immediate complications  Technical procedures used: arthrocentesis  Complications: No  Estimated blood loss (mL): 0  Specimens: No  Implants: No        Labs Reviewed   BASIC METABOLIC PANEL - Abnormal; Notable for the following components:       Result Value    Sodium 135 (*)     Glucose 182 (*)     Calcium 8.2 (*)     All other components within normal limits   CBC WITH DIFFERENTIAL - Abnormal; Notable for the following components:    WBC 14.29 (*)     MPV 8.9 (*)     Neutrophils % 76.9 (*)     Lymphocytes % 14.6 (*)     Monocytes % 6.5 (*)     Eosinophils % 0.9 (*)     Immature Granulocytes % 0.8 (*)     Neutrophils, Abs 10.99 (*)     Monocytes, Absolute 0.93 (*)     Immature Granulocytes, Absolute 0.11 (*)     All other components within normal limits   LACTIC ACID, PLASMA - Normal   URIC ACID - Normal   CBC W/ AUTO DIFFERENTIAL    Narrative:     The following orders were created for panel order CBC auto differential.  Procedure                               Abnormality         Status                     ---------                               -----------         ------                     CBC with Differential[[id number]]        Abnormal            Final result                 Please view results for these tests on the individual orders.   CRYSTAL IDENTIFICATION, SYNOVIAL          Imaging Results          X-Ray Ankle Complete Right (Final result)  Result time 03/25/22  17:37:09    Final result by Orlin Peña DO (03/25/22 17:37:09)                 Impression:      As above.    Point of Service: [facility]      Electronically signed by: Orlin Peña  Date:    03/25/2022  Time:    17:37             Narrative:    EXAMINATION:  XR ANKLE COMPLETE 3 VIEW RIGHT    CLINICAL HISTORY:  Edema, unspecified    COMPARISON:  None    TECHNIQUE:  Frontal, lateral, and oblique views of the right ankle.    FINDINGS:  Plantar calcaneal spurring.  No convincing acute fracture or dislocation demonstrated. No concerning radiopaque foreign body visualized.  Soft tissue swelling.                                 Medications   cefTRIAXone injection 1 g (1 g Intramuscular Given 3/25/22 1722)   morphine injection 2 mg (2 mg Intramuscular Given 3/25/22 1846)   LIDOcaine HCL 10 mg/ml (1%) injection 10 mL (10 mLs Other Given by Other 3/25/22 2030)   LIDOcaine (PF) 10 mg/ml (1%) injection 50 mg (50 mg Infiltration Given by Other 3/25/22 2200)   vancomycin (VANCOCIN) 2,250 mg in dextrose 5 % 500 mL IVPB (0 mg Intravenous Stopped 3/26/22 0418)   labetaloL injection 10 mg (10 mg Intravenous Given 3/26/22 0226)   cloNIDine tablet 0.1 mg (0.1 mg Oral Given 3/26/22 0533)   hydrALAZINE injection 10 mg (10 mg Intravenous Given 3/27/22 0800)   morphine injection 2 mg (2 mg Intravenous Given 3/27/22 0852)                          Clinical Impression:   Final diagnoses:  [R60.9] Edema  [L03.90] Cellulitis, unspecified cellulitis site (Primary)  [M00.9] Septic arthritis of right ankle          ED Disposition Condition    Admit               Asher Zhang MD  05/10/22 1012

## 2022-03-27 VITALS
TEMPERATURE: 99 F | RESPIRATION RATE: 20 BRPM | WEIGHT: 207.88 LBS | DIASTOLIC BLOOD PRESSURE: 87 MMHG | OXYGEN SATURATION: 99 % | SYSTOLIC BLOOD PRESSURE: 135 MMHG | HEIGHT: 69 IN | BODY MASS INDEX: 30.79 KG/M2 | HEART RATE: 96 BPM

## 2022-03-27 PROBLEM — I10 HTN (HYPERTENSION): Status: ACTIVE | Noted: 2022-03-27

## 2022-03-27 LAB — BACTERIA SPEC BFLD CULT: NORMAL

## 2022-03-27 PROCEDURE — 99222 PR INITIAL HOSPITAL CARE,LEVL II: ICD-10-PCS | Mod: ,,, | Performed by: INTERNAL MEDICINE

## 2022-03-27 PROCEDURE — 25000003 PHARM REV CODE 250: Performed by: ORTHOPAEDIC SURGERY

## 2022-03-27 PROCEDURE — 99222 1ST HOSP IP/OBS MODERATE 55: CPT | Mod: ,,, | Performed by: INTERNAL MEDICINE

## 2022-03-27 PROCEDURE — 63600175 PHARM REV CODE 636 W HCPCS: Performed by: ANESTHESIOLOGY

## 2022-03-27 PROCEDURE — 25000003 PHARM REV CODE 250: Performed by: INTERNAL MEDICINE

## 2022-03-27 PROCEDURE — 63600175 PHARM REV CODE 636 W HCPCS: Performed by: ORTHOPAEDIC SURGERY

## 2022-03-27 RX ORDER — HYDROCODONE BITARTRATE AND ACETAMINOPHEN 10; 325 MG/1; MG/1
1 TABLET ORAL EVERY 6 HOURS PRN
Qty: 28 TABLET | Refills: 0
Start: 2022-03-27

## 2022-03-27 RX ORDER — HYDRALAZINE HYDROCHLORIDE 20 MG/ML
10 INJECTION INTRAMUSCULAR; INTRAVENOUS ONCE
Status: COMPLETED | OUTPATIENT
Start: 2022-03-27 | End: 2022-03-27

## 2022-03-27 RX ORDER — ONDANSETRON 4 MG/1
8 TABLET, ORALLY DISINTEGRATING ORAL EVERY 8 HOURS PRN
Status: DISCONTINUED | OUTPATIENT
Start: 2022-03-27 | End: 2022-03-27 | Stop reason: HOSPADM

## 2022-03-27 RX ORDER — ACETAMINOPHEN 500 MG
1000 TABLET ORAL EVERY 6 HOURS PRN
Status: DISCONTINUED | OUTPATIENT
Start: 2022-03-27 | End: 2022-03-27 | Stop reason: HOSPADM

## 2022-03-27 RX ORDER — HYDROCODONE BITARTRATE AND ACETAMINOPHEN 5; 325 MG/1; MG/1
1 TABLET ORAL EVERY 4 HOURS PRN
Status: DISCONTINUED | OUTPATIENT
Start: 2022-03-27 | End: 2022-03-27 | Stop reason: HOSPADM

## 2022-03-27 RX ORDER — AMLODIPINE BESYLATE 5 MG/1
5 TABLET ORAL DAILY
Status: DISCONTINUED | OUTPATIENT
Start: 2022-03-27 | End: 2022-03-27 | Stop reason: HOSPADM

## 2022-03-27 RX ORDER — MORPHINE SULFATE 2 MG/ML
2 INJECTION, SOLUTION INTRAMUSCULAR; INTRAVENOUS ONCE
Status: COMPLETED | OUTPATIENT
Start: 2022-03-27 | End: 2022-03-27

## 2022-03-27 RX ORDER — PROMETHAZINE HYDROCHLORIDE 25 MG/1
25 TABLET ORAL EVERY 6 HOURS PRN
Status: DISCONTINUED | OUTPATIENT
Start: 2022-03-27 | End: 2022-03-27 | Stop reason: HOSPADM

## 2022-03-27 RX ORDER — HYDROCODONE BITARTRATE AND ACETAMINOPHEN 10; 325 MG/1; MG/1
1 TABLET ORAL EVERY 4 HOURS PRN
Status: DISCONTINUED | OUTPATIENT
Start: 2022-03-27 | End: 2022-03-27 | Stop reason: HOSPADM

## 2022-03-27 RX ORDER — MORPHINE SULFATE 2 MG/ML
2 INJECTION, SOLUTION INTRAMUSCULAR; INTRAVENOUS ONCE
Status: DISCONTINUED | OUTPATIENT
Start: 2022-03-27 | End: 2022-03-27

## 2022-03-27 RX ADMIN — CLINDAMYCIN IN 5 PERCENT DEXTROSE 600 MG: 12 INJECTION, SOLUTION INTRAVENOUS at 08:03

## 2022-03-27 RX ADMIN — HYDROCODONE BITARTRATE AND ACETAMINOPHEN 1 TABLET: 10; 325 TABLET ORAL at 05:03

## 2022-03-27 RX ADMIN — ALUMINUM HYDROXIDE, MAGNESIUM HYDROXIDE, AND SIMETHICONE 30 ML: 200; 200; 20 SUSPENSION ORAL at 01:03

## 2022-03-27 RX ADMIN — AMLODIPINE BESYLATE 5 MG: 5 TABLET ORAL at 08:03

## 2022-03-27 RX ADMIN — HYDRALAZINE HYDROCHLORIDE 10 MG: 20 INJECTION INTRAMUSCULAR; INTRAVENOUS at 08:03

## 2022-03-27 RX ADMIN — SODIUM CHLORIDE, POTASSIUM CHLORIDE, SODIUM LACTATE AND CALCIUM CHLORIDE 125 ML/HR: 600; 310; 30; 20 INJECTION, SOLUTION INTRAVENOUS at 05:03

## 2022-03-27 RX ADMIN — MORPHINE SULFATE 2 MG: 2 INJECTION, SOLUTION INTRAMUSCULAR; INTRAVENOUS at 08:03

## 2022-03-27 NOTE — SUBJECTIVE & OBJECTIVE
Past Medical History:   Diagnosis Date    Septic arthritis of right ankle 3/25/2022       History reviewed. No pertinent surgical history.    Review of patient's allergies indicates:  No Known Allergies    No current facility-administered medications on file prior to encounter.     Current Outpatient Medications on File Prior to Encounter   Medication Sig    esomeprazole (NEXIUM) 40 MG capsule Take 40 mg by mouth before breakfast.     Family History    None       Tobacco Use    Smoking status: Current Every Day Smoker     Types: Cigarettes    Smokeless tobacco: Never Used   Substance and Sexual Activity    Alcohol use: Never    Drug use: Not Currently    Sexual activity: Yes     Review of Systems   Constitutional:  Positive for activity change and chills.   Respiratory:  Negative for shortness of breath.    Cardiovascular:  Negative for chest pain.   Gastrointestinal:  Negative for abdominal pain, constipation, diarrhea, nausea and vomiting.   Skin:  Positive for wound.   Neurological:  Negative for dizziness, facial asymmetry, light-headedness, numbness and headaches.   All other systems reviewed and are negative.  Objective:     Vital Signs (Most Recent):  Temp: 98.9 °F (37.2 °C) (03/27/22 0741)  Pulse: 96 (03/27/22 0741)  Resp: 20 (03/27/22 0852)  BP: 135/87 (03/27/22 0800)  SpO2: 99 % (03/27/22 0741) Vital Signs (24h Range):  Temp:  [97.4 °F (36.3 °C)-98.9 °F (37.2 °C)] 98.9 °F (37.2 °C)  Pulse:  [82-96] 96  Resp:  [16-20] 20  SpO2:  [96 %-99 %] 99 %  BP: (128-174)/() 135/87     Weight: 94.3 kg (207 lb 14.4 oz)  Body mass index is 30.7 kg/m².    Physical Exam  Vitals reviewed.   Constitutional:       General: He is not in acute distress.     Appearance: Normal appearance.   HENT:      Head: Normocephalic and atraumatic.      Nose: Nose normal.      Mouth/Throat:      Mouth: Mucous membranes are moist.   Eyes:      Extraocular Movements: Extraocular movements intact.      Pupils: Pupils are equal, round,  and reactive to light.   Cardiovascular:      Rate and Rhythm: Normal rate.      Pulses: Normal pulses.      Heart sounds: Normal heart sounds.   Abdominal:      General: There is no distension.      Palpations: Abdomen is soft.      Tenderness: There is no abdominal tenderness.   Musculoskeletal:         General: Tenderness present.      Cervical back: Normal range of motion and neck supple. No rigidity or tenderness.   Skin:     General: Skin is warm.      Capillary Refill: Capillary refill takes less than 2 seconds.      Comments: Dsg to right lower extremity d/I. Pt has motor and sensory function intact with pulses in all 4 extremities.     Neurological:      General: No focal deficit present.      Mental Status: He is alert and oriented to person, place, and time.      Cranial Nerves: No cranial nerve deficit.      Sensory: No sensory deficit.   Psychiatric:         Mood and Affect: Mood normal.         Behavior: Behavior normal.       Significant Labs: All pertinent labs within the past 24 hours have been reviewed.    Significant Imaging: I have reviewed all pertinent imaging results/findings within the past 24 hours.

## 2022-03-27 NOTE — CONSULTS
[address] Medicine  Consult Note    Patient Name: Caleb Charlton  MRN: [id number]  Admission Date: 3/25/2022  Hospital Length of Stay: 2 days  Attending Physician: Jalen Cohen MD   Primary Care Provider: Primary Doctor No           Patient information was obtained from patient and ER records.     Consults  Subjective:     Principal Problem: Septic arthritis of right ankle    Chief Complaint:   Chief Complaint   Patient presents with    Leg Swelling        HPI: 45 y/o male seen in medical management today s/p I/D of right lower extremity. The patient has a 4 day history of swelling and pain to right leg. He had cloudy aspirate drawn in ED. Pt is p/o day #1 ID with dsg d/I. He denies any medical history. Currently is a daily smoker.      Past Medical History:   Diagnosis Date    Septic arthritis of right ankle 3/25/2022       History reviewed. No pertinent surgical history.    Review of patient's allergies indicates:  No Known Allergies    No current facility-administered medications on file prior to encounter.     Current Outpatient Medications on File Prior to Encounter   Medication Sig    esomeprazole (NEXIUM) 40 MG capsule Take 40 mg by mouth before breakfast.     Family History    None       Tobacco Use    Smoking status: Current Every Day Smoker     Types: Cigarettes    Smokeless tobacco: Never Used   Substance and Sexual Activity    Alcohol use: Never    Drug use: Not Currently    Sexual activity: Yes     Review of Systems   Constitutional:  Positive for activity change and chills.   Respiratory:  Negative for shortness of breath.    Cardiovascular:  Negative for chest pain.   Gastrointestinal:  Negative for abdominal pain, constipation, diarrhea, nausea and vomiting.   Skin:  Positive for wound.   Neurological:  Negative for dizziness, facial asymmetry, light-headedness, numbness and headaches.   All other systems reviewed and are negative.  Objective:      Vital Signs (Most Recent):  Temp: 98.9 °F (37.2 °C) (03/27/22 0741)  Pulse: 96 (03/27/22 0741)  Resp: 20 (03/27/22 0852)  BP: 135/87 (03/27/22 0800)  SpO2: 99 % (03/27/22 0741) Vital Signs (24h Range):  Temp:  [97.4 °F (36.3 °C)-98.9 °F (37.2 °C)] 98.9 °F (37.2 °C)  Pulse:  [82-96] 96  Resp:  [16-20] 20  SpO2:  [96 %-99 %] 99 %  BP: (128-174)/() 135/87     Weight: 94.3 kg (207 lb 14.4 oz)  Body mass index is 30.7 kg/m².    Physical Exam  Vitals reviewed.   Constitutional:       General: He is not in acute distress.     Appearance: Normal appearance.   HENT:      Head: Normocephalic and atraumatic.      Nose: Nose normal.      Mouth/Throat:      Mouth: Mucous membranes are moist.   Eyes:      Extraocular Movements: Extraocular movements intact.      Pupils: Pupils are equal, round, and reactive to light.   Cardiovascular:      Rate and Rhythm: Normal rate.      Pulses: Normal pulses.      Heart sounds: Normal heart sounds.   Abdominal:      General: There is no distension.      Palpations: Abdomen is soft.      Tenderness: There is no abdominal tenderness.   Musculoskeletal:         General: Tenderness present.      Cervical back: Normal range of motion and neck supple. No rigidity or tenderness.   Skin:     General: Skin is warm.      Capillary Refill: Capillary refill takes less than 2 seconds.      Comments: Dsg to right lower extremity d/I. Pt has motor and sensory function intact with pulses in all 4 extremities.     Neurological:      General: No focal deficit present.      Mental Status: He is alert and oriented to person, place, and time.      Cranial Nerves: No cranial nerve deficit.      Sensory: No sensory deficit.   Psychiatric:         Mood and Affect: Mood normal.         Behavior: Behavior normal.       Significant Labs: All pertinent labs within the past 24 hours have been reviewed.    Significant Imaging: I have reviewed all pertinent imaging results/findings within the past 24  hours.    Assessment/Plan:     * Septic arthritis of right ankle  - Orders per orthopedics specific to surgery   - SCD lower legs   - Anticoagulation When ok Surgery   - Incentive spirometry hourly   - BC x 2 negative 24 hours  - WC negative to date      HTN (hypertension)  Norvasc 5 mg po daily. Establish care with PCP at [facility] to monitor BP and primary care needs.      VTE Risk Mitigation (From admission, onward)         Ordered     Place PlexiPulse compression devices to both feet  Until discontinued         03/27/22 0919     Place ELINOR hose  Until discontinued         03/25/22 2228     Place sequential compression device  Until discontinued         03/25/22 2228                    Thank you for your consult. I will sign off. Please contact us if you have any additional questions.    Jethro Myers, PEÑAP  Department of Hospital Medicine   [facility] - [address] [place] [address]

## 2022-03-27 NOTE — HPI
47 y/o male seen in medical management today s/p I/D of right lower extremity. The patient has a 4 day history of swelling and pain to right leg. He had cloudy aspirate drawn in ED. Pt is p/o day #1 ID with dsg d/I. He denies any medical history. Currently is a daily smoker.

## 2022-03-27 NOTE — NURSING
Coty  () notified of consult for assistance with medicine.  She will check to see if they have any Good Rx cards but it will be tomorrow before they can get him any other kind of assistance.

## 2022-03-27 NOTE — ASSESSMENT & PLAN NOTE
- Orders per orthopedics specific to surgery   - SCD lower legs   - Anticoagulation When ok Surgery   - Incentive spirometry hourly

## 2022-03-27 NOTE — DISCHARGE SUMMARY
[facility] - [address] [place] [address]  Orthopedics  Discharge Summary      Patient Name: Caleb Charlton  MRN: [id number]  Admission Date: 3/25/2022  Hospital Length of Stay: 2 days  Discharge Date and Time:  03/27/2022 9:17 AM  Attending Physician: Adriana Manjarrez MD   Discharging Provider: Adriana Manjarrez MD  Primary Care Provider: Primary Doctor No    HPI:   46-year-old male with a 4 day history of right ankle swelling and pain with fever and chills starting 3 days ago who had his joint aspirate emergency room with cloudy material obtained with septic arthritis of the right ankle joint needing irrigation debridement of the ankle.  Right lower extremity moves his toes he has sensation to touch has palpable pulses he has swelling about the ankle pain on attempted motion of the ankle.  Does move his toes.  Aspiration revealed cloudy fluid from the ankle joint.  Patient aspirated in the emergency room  X-rays show no fracture subluxation right ankle.  Impression septic right ankle joint  Plan irrigation debridement right ankle with admission for IV antibiotics        Procedure(s) (LRB):  IRRIGATION AND DEBRIDEMENT, LOWER EXTREMITY (Right)      Hospital Course:  No notes on file patient to the hospital on 03/25/2022 for septic arthritis of the right ankle.  Underwent an I& D. This point cultures show no growth.  He had 57,000 white count.  Drains DC postop day 2.  He has been on IV antibiotics for 48 hours.  He will be changed to clindamycin p.o. 1 p.o. t.i.d. for 2 weeks.  Keep dressing intact.  DC home.  Follow-up 2 weeks.  Can weightbear as tolerates on his right lower extremity with crutches.  No complications during his stay.    Goals of Care Treatment Preferences:  Code Status: Full Code      Consults (From admission, onward)        Status Ordering Provider     Inpatient consult to Hospital Medicine  Once        Provider:  Sabino Awan MD    Acknowledged ADRIANA MANJARREZ     Inpatient consult to Social Work  " Once        Provider:  (Not yet assigned)    Acknowledged ADRIANA MANJARREZ     Pharmacy to dose Vancomycin consult  Once        Provider:  (Not yet assigned)   "And" Linked Group Details    Acknowledged ADRIANA MANJARREZ     Inpatient consult to Orthopedic Surgery  Once        Provider:  Adriana Manjarrez MD    Acknowledged KAM KIM          Significant Diagnostic Studies: Labs: All labs within the past 24 hours have been reviewed    Pending Diagnostic Studies:     None        Final Active Diagnoses:    Diagnosis Date Noted POA    PRINCIPAL PROBLEM:  Septic arthritis of right ankle [M00.9] 03/25/2022 Yes      Problems Resolved During this Admission:      Discharged Condition: good    Disposition: Home or Self Care    Follow Up:    Patient Instructions:   No discharge procedures on file.  Medications:  Reconciled Home Medications:      Medication List      START taking these medications    clindamycin 300 MG capsule  Commonly known as: CLEOCIN  Take 1 capsule (300 mg total) by mouth every 6 (six) hours. for 10 days     ibuprofen 800 MG tablet  Commonly known as: ADVIL,MOTRIN  Take 1 tablet (800 mg total) by mouth every 8 (eight) hours as needed for Pain.        ASK your doctor about these medications    esomeprazole 40 MG capsule  Commonly known as: NEXIUM  Take 40 mg by mouth before breakfast.            Adriana Manjarrez MD  Orthopedics  [facility]  "

## 2022-03-27 NOTE — NURSING
Stefanie PT will bring crutches up for patient.      Good Rx cards supplied to patient per Coty in .

## 2022-03-27 NOTE — PLAN OF CARE
[facility] - [facility]  Initial Discharge Assessment       Primary Care Provider: Primary Doctor No    Admission Diagnosis: Edema [R60.9]  Septic arthritis of right ankle [M00.9]  Cellulitis, unspecified cellulitis site [L03.90]    Admission Date: 3/25/2022  Expected Discharge Date: 3/27/2022    Discharge Barriers Identified: (P) [employer]    Payor: /     Extended Emergency Contact Information  Primary Emergency Contact: Harshad Charlton  Mobile Phone: [phone]  Relation: Daughter  Preferred language: English   needed? No    Discharge Plan A: (P) Other (Home with friend)  Discharge Plan B: (P) Other (Home with friend)    No Pharmacies Listed    Initial Assessment (most recent)       Adult Discharge Assessment - 03/27/22 1041          Discharge Assessment    Assessment Type Discharge Planning Assessment (P)      Source of Information patient (P)      Lives With friend(s) (P)      Do you expect to return to your current living situation? Yes (P)      Do you have help at home or someone to help you manage your care at home? No (P)      Current cognitive status: Alert/Oriented (P)      Equipment Currently Used at Home none (P)      Do you currently have service(s) that help you manage your care at home? No (P)      Who is going to help you get home at discharge? Yue Charlton (Daughter) [phone] (P)      Discharge Plan A Other (P)    Home with friend    Discharge Plan B Other (P)    Home with friend    DME Needed Upon Discharge  crutches (P)      Discharge Plan discussed with: Patient (P)      Discharge Barriers Identified [employer] (P)                  SW consulted for assistance with medications as pt is uninsured. Spoke with pt who reports he lives with a friend and will return there at discharge.  Pt also reports he has been in contact with [facility] ([facility])  about assistance with housing. Pt is not current with  and has no DME. Provided pt with  insurance savings cards, application for [facility], as well as [facility]. Nurse also informed that pt needs crutches for 2 weeks which were obtained by PT from outpatient surgery. SW following for discharge needs.

## 2022-03-27 NOTE — NURSING
Dr. Cohen in to pull drain.  Dr. Cohen said to leave dressing just like it is.  Patient to be discharged.

## 2022-03-27 NOTE — PLAN OF CARE
[facility] - [facility] Telemetry  Discharge Final Note    Primary Care Provider: Primary Doctor No    Expected Discharge Date: 3/27/2022    Final Discharge Note (most recent)       Final Note - 03/27/22 1056          Final Note    Assessment Type Final Discharge Note (P)      Anticipated Discharge Disposition Home or Self Care (P)      What phone number can be called within the next 1-3 days to see how you are doing after discharge? [phone] (P)      Hospital Resources/Appts/Education Provided Community resources provided (P)         Post-Acute Status    Discharge Delays None known at this time (P)                      Important Message from Medicare             Contact Info       Jalen Cohen MD   Specialty: Orthopedic Surgery    [address]  [address]  [place] [facility] [place], [facility] [address]   Phone: [phone]       Next Steps: Follow up in 2 week(s)    Instructions: For suture removal    Heydi Whittaker MD   Specialty: Family Medicine    [address] [facility] [address]   Phone: [phone]       Next Steps: Follow up in 1 week(s)        Pt is scheduled for discharge today and will live with a friend. Pt provided with prescription saving cards, list of [employer] prescription program and application to [facility]. No further needs at this time.

## 2022-03-27 NOTE — PROGRESS NOTES
PT evaluation order received for patient with septic arthritis right ankle. Order for WBAT right LE with crutches. Pt reports confidence with crutches from remote achilles injury but no longer has crutches at home. Crutches issued and fit to patient. Patient unable to weight bear due to pain but safe with NWB ambulation using crutches until pain decreases. Patient to d/c home with family today. No skilled PT need.

## 2022-03-28 LAB
BACTERIA SPEC ANAEROBE CULT: NORMAL
MICROORGANISM SPEC CULT: NORMAL

## 2022-03-31 LAB
BACTERIA BLD CULT: NORMAL
BACTERIA BLD CULT: NORMAL

## 2022-04-27 NOTE — PHYSICIAN QUERY
Physician Query Form    Coding and Education Department    Procedure Clarification       Patient Name: Caleb Charlton  MRN: [id number]  Date of Service: 3/25/2020  Hospital Account Number: [id number]                Dr. Zhang  Based on cultures of synovial fluid and final diagnosis of edema, please clarify more specifically location of the ankle that was aspirated (ex: subcutaneous, joint, soft tissue, muscle, fascia, bone) and let me know when your amendment is completed so we can process the account accordingly.    I'm available if you have any questions.  Thank you for your help           Thank you,  Edel Martines  Inpatient

## (undated) DEVICE — GLOVE SURGICAL PROTEXIS PI CLASSIC SIZE 8.5

## (undated) DEVICE — GOWN SURGICAL STERILE LEVEL 3 / XX-LARGE

## (undated) DEVICE — SOL IRRIGATION SALINE 3000ML BAG

## (undated) DEVICE — SUTURE MONOCRYL 2-0 CT-1

## (undated) DEVICE — STAPLER SKIN PROXIMATE PLUS MD 35 REG DISP

## (undated) DEVICE — GLOVE SURGICAL PROTEXIS PI BLUE SIZE 8.0

## (undated) DEVICE — CULTURETTE AEROBIC SWAB

## (undated) DEVICE — CDS EXTREMITY

## (undated) DEVICE — SOL IRRIGATION SALINE 0.9% 1000ML BOTTLE

## (undated) DEVICE — TOURNIQUET CUFF DISP QC 44 INCH

## (undated) DEVICE — TRAY SKIN PREP PROVIDONE IODINE STERILE LATEX FREE

## (undated) DEVICE — CULTURETTE ANAEROBIC COLLECTOR

## (undated) DEVICE — GLOVE SURGICAL PROTEXIS PI CLASSIC SIZE 8.0

## (undated) DEVICE — PADDING CAST SPECIALIST SYNTHETIC 4IN X 4YD STERILE

## (undated) DEVICE — GLOVE SURGICAL PROTEXIS PI CLASSIC SIZE 7.5

## (undated) DEVICE — SUTURE ETHILON 3-0 18 BLK PS2

## (undated) DEVICE — SPONGE GAUZE 4X4 12 PLY STL AMD 10/TRAY

## (undated) DEVICE — STOCKINETTE 6X48 ORTHO STL 2PY